# Patient Record
Sex: MALE | Race: WHITE | NOT HISPANIC OR LATINO | Employment: UNEMPLOYED | URBAN - METROPOLITAN AREA
[De-identification: names, ages, dates, MRNs, and addresses within clinical notes are randomized per-mention and may not be internally consistent; named-entity substitution may affect disease eponyms.]

---

## 2017-08-11 ENCOUNTER — TELEPHONE (OUTPATIENT)
Dept: INTERNAL MEDICINE | Facility: CLINIC | Age: 52
End: 2017-08-11

## 2017-08-11 NOTE — TELEPHONE ENCOUNTER
----- Message from Chikis Prince sent at 8/11/2017 11:29 AM CDT -----  Contact: CLARICE LING [[id number]]  _x  1st Request  _  2nd Request  _  3rd Request    Please refill the medication(s) listed below. Please call the patient when the prescription(s) is ready for  at the phone number [phone]    Medication #1 sildenafil (VIAGRA) 50 MG tablet    Preferred Pharmacy: [facility]/pharmacy #[id number] - [address]

## 2017-08-11 NOTE — TELEPHONE ENCOUNTER
Pt lov:8/2015. Pt informed of needing an appt for eval of rx requested. Pt scheduled. Patient has no further questions or concerns.

## 2017-08-14 ENCOUNTER — OFFICE VISIT (OUTPATIENT)
Dept: INTERNAL MEDICINE | Facility: CLINIC | Age: 52
End: 2017-08-14
Payer: COMMERCIAL

## 2017-08-14 VITALS
BODY MASS INDEX: 24.16 KG/M2 | SYSTOLIC BLOOD PRESSURE: 130 MMHG | HEIGHT: 73 IN | HEART RATE: 74 BPM | DIASTOLIC BLOOD PRESSURE: 88 MMHG | WEIGHT: 182.31 LBS

## 2017-08-14 DIAGNOSIS — Z87.891 FORMER SMOKER: ICD-10-CM

## 2017-08-14 DIAGNOSIS — N52.9 ERECTILE DYSFUNCTION, UNSPECIFIED ERECTILE DYSFUNCTION TYPE: Primary | ICD-10-CM

## 2017-08-14 DIAGNOSIS — Z00.00 ANNUAL PHYSICAL EXAM: ICD-10-CM

## 2017-08-14 DIAGNOSIS — R03.0 ELEVATED BLOOD PRESSURE READING IN OFFICE WITHOUT DIAGNOSIS OF HYPERTENSION: ICD-10-CM

## 2017-08-14 PROBLEM — F10.21 ALCOHOLISM IN RECOVERY: Status: ACTIVE | Noted: 2017-08-14

## 2017-08-14 PROCEDURE — 99999 PR PBB SHADOW E&M-EST. PATIENT-LVL III: CPT | Mod: PBBFAC,,, | Performed by: INTERNAL MEDICINE

## 2017-08-14 PROCEDURE — 99213 OFFICE O/P EST LOW 20 MIN: CPT | Mod: S$GLB,,, | Performed by: INTERNAL MEDICINE

## 2017-08-14 PROCEDURE — 3008F BODY MASS INDEX DOCD: CPT | Mod: S$GLB,,, | Performed by: INTERNAL MEDICINE

## 2017-08-14 RX ORDER — SILDENAFIL 50 MG/1
50 TABLET, FILM COATED ORAL DAILY PRN
Qty: 10 TABLET | Refills: 3 | Status: SHIPPED | OUTPATIENT
Start: 2017-08-14 | End: 2017-09-14

## 2017-08-14 NOTE — PROGRESS NOTES
"Subjective:       Patient ID: Wes Tenorio is a 52 y.o. male.    Chief Complaint: No chief complaint on file.      Pt here seeking refill on Viagra.  He was rx'd this initially 2 yrs ago by Dr Bradley.  Pt says he hasn't used it often but it has worked well when he has.      He has no other c/o.           Review of Systems   Constitutional: Negative.    HENT: Negative.    Eyes: Negative.    Respiratory: Negative.        Objective:       Vitals:    08/14/17 0818   BP: 130/88   BP Location: Left arm   Patient Position: Sitting   BP Method: Large (Manual)   Pulse: 74   Weight: 82.7 kg (182 lb 5.1 oz)   Height: 6' 1" (1.854 m)     Physical Exam   Constitutional: He is oriented to person, place, and time. He appears well-developed and well-nourished. No distress.   HENT:   Head: Normocephalic and atraumatic.   Eyes: Conjunctivae and EOM are normal. Pupils are equal, round, and reactive to light.   Neurological: He is alert and oriented to person, place, and time.   Skin: No rash noted. He is not diaphoretic.   Psychiatric: He has a normal mood and affect. His behavior is normal. Thought content normal.       Assessment:       1. Erectile dysfunction, unspecified erectile dysfunction type    2. Former smoker    3. Annual physical exam    4. Elevated blood pressure reading in office without diagnosis of hypertension        Plan:           ED - Stable.  Doing well on Viagra PRN.  This was refilled.    HM - Labs ordered to be done before annual exam in approx 1 mo.      Elevated BP - Pt advised regarding lifestyle modifications.  Cont to monitor.      Former smoker - Pt quit smoking cigarettes.  Pt congratulated on this and encouraged to stay quit.  He is using a vape to try to wean off of nicotine entirely.         "

## 2017-09-07 ENCOUNTER — LAB VISIT (OUTPATIENT)
Dept: LAB | Facility: OTHER | Age: 52
End: 2017-09-07
Attending: INTERNAL MEDICINE
Payer: COMMERCIAL

## 2017-09-07 DIAGNOSIS — Z00.00 ANNUAL PHYSICAL EXAM: ICD-10-CM

## 2017-09-07 LAB
ALBUMIN SERPL BCP-MCNC: 4.1 G/DL
ALP SERPL-CCNC: 65 U/L
ALT SERPL W/O P-5'-P-CCNC: 19 U/L
ANION GAP SERPL CALC-SCNC: 9 MMOL/L
AST SERPL-CCNC: 26 U/L
BILIRUB SERPL-MCNC: 0.6 MG/DL
BUN SERPL-MCNC: 21 MG/DL
CALCIUM SERPL-MCNC: 9.6 MG/DL
CHLORIDE SERPL-SCNC: 108 MMOL/L
CHOLEST SERPL-MCNC: 156 MG/DL
CHOLEST/HDLC SERPL: 2.7 {RATIO}
CO2 SERPL-SCNC: 26 MMOL/L
CREAT SERPL-MCNC: 0.9 MG/DL
EST. GFR  (AFRICAN AMERICAN): >60 ML/MIN/1.73 M^2
EST. GFR  (NON AFRICAN AMERICAN): >60 ML/MIN/1.73 M^2
ESTIMATED AVG GLUCOSE: 91 MG/DL
GLUCOSE SERPL-MCNC: 94 MG/DL
HBA1C MFR BLD HPLC: 4.8 %
HDLC SERPL-MCNC: 57 MG/DL
HDLC SERPL: 36.5 %
LDLC SERPL CALC-MCNC: 86.4 MG/DL
NONHDLC SERPL-MCNC: 99 MG/DL
POTASSIUM SERPL-SCNC: 4.3 MMOL/L
PROT SERPL-MCNC: 7.4 G/DL
SODIUM SERPL-SCNC: 143 MMOL/L
TRIGL SERPL-MCNC: 63 MG/DL

## 2017-09-07 PROCEDURE — 80053 COMPREHEN METABOLIC PANEL: CPT

## 2017-09-07 PROCEDURE — 36415 COLL VENOUS BLD VENIPUNCTURE: CPT

## 2017-09-07 PROCEDURE — 80061 LIPID PANEL: CPT

## 2017-09-07 PROCEDURE — 83036 HEMOGLOBIN GLYCOSYLATED A1C: CPT

## 2017-09-14 ENCOUNTER — OFFICE VISIT (OUTPATIENT)
Dept: INTERNAL MEDICINE | Facility: CLINIC | Age: 52
End: 2017-09-14
Attending: INTERNAL MEDICINE
Payer: COMMERCIAL

## 2017-09-14 VITALS
HEIGHT: 73 IN | WEIGHT: 188.94 LBS | BODY MASS INDEX: 25.04 KG/M2 | SYSTOLIC BLOOD PRESSURE: 122 MMHG | DIASTOLIC BLOOD PRESSURE: 64 MMHG | HEART RATE: 71 BPM

## 2017-09-14 DIAGNOSIS — F10.21 ALCOHOLISM IN RECOVERY: ICD-10-CM

## 2017-09-14 DIAGNOSIS — Z00.00 ANNUAL PHYSICAL EXAM: Primary | ICD-10-CM

## 2017-09-14 DIAGNOSIS — Z87.891 FORMER SMOKER: ICD-10-CM

## 2017-09-14 DIAGNOSIS — Z12.11 COLON CANCER SCREENING: ICD-10-CM

## 2017-09-14 PROCEDURE — 99396 PREV VISIT EST AGE 40-64: CPT | Mod: S$GLB,,, | Performed by: INTERNAL MEDICINE

## 2017-09-14 PROCEDURE — 99999 PR PBB SHADOW E&M-EST. PATIENT-LVL III: CPT | Mod: PBBFAC,,, | Performed by: INTERNAL MEDICINE

## 2017-09-14 RX ORDER — SILDENAFIL 100 MG/1
100 TABLET, FILM COATED ORAL DAILY PRN
Qty: 10 TABLET | Refills: 5 | Status: SHIPPED | OUTPATIENT
Start: 2017-09-14 | End: 2017-10-14

## 2017-09-14 NOTE — PROGRESS NOTES
"Subjective:       Patient ID: Wes Tenorio is a 52 y.o. male.    Chief Complaint: Follow-up    Here for f/u  Last seen by me 2 years prior 08/2015    Since then 4/2016 he was admitted to ICU for overdose on benzodiazepene, opiate and EtOH requiring ICU admission and intubation. He reports he has been sober since. He has quite smoking cigarettes as of 6 months ago. He is using e-cig but has been cutting down on frequency of use and has no intention of remaining on e-cig indefinitely. He denies chest tightness, wheezing. Has decreased his energy drink consumption. cna drink up to 4 a day.          Review of Systems   Constitutional: Negative for appetite change, chills, fever and unexpected weight change.   HENT: Negative for hearing loss, sore throat and trouble swallowing.    Eyes: Negative for visual disturbance.   Respiratory: Negative for cough, chest tightness and shortness of breath.    Cardiovascular: Negative for chest pain and leg swelling.   Gastrointestinal: Negative for abdominal pain, blood in stool, constipation, diarrhea, nausea and vomiting.   Endocrine: Negative for polydipsia and polyuria.   Genitourinary: Negative for decreased urine volume, difficulty urinating, dysuria, frequency and urgency.   Musculoskeletal: Negative for gait problem.   Skin: Negative for rash.   Neurological: Negative for dizziness and numbness.   Psychiatric/Behavioral: The patient is not nervous/anxious.        Objective:      Vitals:    09/14/17 0803   BP: 122/64   Pulse: 71   Weight: 85.7 kg (188 lb 15 oz)   Height: 6' 1" (1.854 m)      Physical Exam   Constitutional: He is oriented to person, place, and time. He appears well-developed and well-nourished. No distress.   HENT:   Head: Normocephalic and atraumatic.   Mouth/Throat: Oropharynx is clear and moist. No oropharyngeal exudate.   Eyes: Conjunctivae and EOM are normal. Pupils are equal, round, and reactive to light. No scleral icterus.   Neck: No " thyromegaly present.   Cardiovascular: Normal rate, regular rhythm and normal heart sounds.    No murmur heard.  Pulmonary/Chest: Effort normal and breath sounds normal. He has no wheezes. He has no rales.   Abdominal: Soft. He exhibits no distension. There is no tenderness.   Musculoskeletal: He exhibits no edema or tenderness.   Lymphadenopathy:     He has no cervical adenopathy.   Neurological: He is alert and oriented to person, place, and time.   Skin: Skin is warm and dry.   Psychiatric: He has a normal mood and affect. His behavior is normal.       Assessment:       1. Annual physical exam    2. Colon cancer screening    3. Alcoholism in recovery    4. Former smoker        Plan:       Wes Nobles was seen today for follow-up.    Diagnoses and all orders for this visit:    Annual physical exam  -labs done recently and reviewed with pt. All question answered.  Colon cancer screening  -     Ambulatory referral to Gastroenterology    Alcoholism in recovery  -doing well. Continue to monitor. Continue AA    Former smoker  -doing well. Will continue to check on continued cessation    Other orders  -     sildenafil (VIAGRA) 100 MG tablet; Take 1 tablet (100 mg total) by mouth daily as needed for Erectile Dysfunction.             Side effects of medication(s) were discussed in detail and patient voiced understanding.  Patient will call back for any issues or complications.

## 2018-05-25 DIAGNOSIS — Z12.11 COLON CANCER SCREENING: ICD-10-CM

## 2019-03-11 ENCOUNTER — PATIENT OUTREACH (OUTPATIENT)
Dept: ADMINISTRATIVE | Facility: HOSPITAL | Age: 54
End: 2019-03-11

## 2019-03-11 NOTE — PROGRESS NOTES
Dear Wes Tenorio,     [facility] is committed to your overall health.  Our records indicate that you are due for an annual checkup with your primary care provider,  Dr. Justen Culver MD.  Please call [phone] to schedule a routine physical exam.  You can also schedule your appointment via your [facility]. You may also be due for the following test and/or procedures:    Health Maintenance Due   Topic Date Due    TETANUS VACCINE  06/26/1985    Pneumococcal Vaccine (Medium Risk) (1 of 1 - PPSV23) 06/26/1986    Colonoscopy  06/26/2017    Influenza Vaccine  08/01/2018       If you have chosen another Primary Care Physician please contact us so that your medical records can be updated.         If you have had any of the above done at another facility, please let us know by calling [phone]; so that we can update your record.  We will add these results to your chart if you fax them to the fax number listed below.  If you have any questions, please call [phone].    Thanks,       Additional Information  If you have questions, you can email [email or web address] or call [phone]  to talk to our [facility] staff. Remember, [facility] is NOT to be used for urgent needs. For medical emergencies, dial 911.

## 2019-05-31 DIAGNOSIS — Z12.11 COLON CANCER SCREENING: ICD-10-CM
